# Patient Record
Sex: MALE | Race: WHITE | NOT HISPANIC OR LATINO | Employment: OTHER | URBAN - METROPOLITAN AREA
[De-identification: names, ages, dates, MRNs, and addresses within clinical notes are randomized per-mention and may not be internally consistent; named-entity substitution may affect disease eponyms.]

---

## 2024-02-01 ENCOUNTER — NURSING HOME VISIT (OUTPATIENT)
Dept: GERIATRICS | Facility: CLINIC | Age: 79
End: 2024-02-01
Payer: MEDICARE

## 2024-02-01 DIAGNOSIS — J96.01 ACUTE RESPIRATORY FAILURE WITH HYPOXIA (H): ICD-10-CM

## 2024-02-01 DIAGNOSIS — U07.1 INFECTION DUE TO 2019 NOVEL CORONAVIRUS: Primary | ICD-10-CM

## 2024-02-01 DIAGNOSIS — J43.2 CENTRILOBULAR EMPHYSEMA (H): ICD-10-CM

## 2024-02-01 DIAGNOSIS — D84.9 IMMUNOSUPPRESSED STATUS (H): ICD-10-CM

## 2024-02-01 DIAGNOSIS — Z79.4 TYPE 2 DIABETES MELLITUS WITHOUT COMPLICATION, WITH LONG-TERM CURRENT USE OF INSULIN (H): ICD-10-CM

## 2024-02-01 DIAGNOSIS — M05.741 RHEUMATOID ARTHRITIS INVOLVING BOTH HANDS WITH POSITIVE RHEUMATOID FACTOR (H): ICD-10-CM

## 2024-02-01 DIAGNOSIS — M05.742 RHEUMATOID ARTHRITIS INVOLVING BOTH HANDS WITH POSITIVE RHEUMATOID FACTOR (H): ICD-10-CM

## 2024-02-01 DIAGNOSIS — E11.9 TYPE 2 DIABETES MELLITUS WITHOUT COMPLICATION, WITH LONG-TERM CURRENT USE OF INSULIN (H): ICD-10-CM

## 2024-02-01 DIAGNOSIS — D62 ACUTE BLOOD LOSS ANEMIA: ICD-10-CM

## 2024-02-01 PROCEDURE — 99305 1ST NF CARE MODERATE MDM 35: CPT | Performed by: FAMILY MEDICINE

## 2024-02-01 NOTE — PROGRESS NOTES
[facility]    PRIMARY CARE PROVIDER AND CLINIC:  No primary care provider on file., No primary physician on file.  Chief complaint: New admission     [facility] Medical Record Number:  [id number]  Place of Service where encounter took place:  [facility] ([facility]) [[id number]]    Addison Silverman  is a 78 year old  (1945), admitted to the above facility from [facility]..   HPI: Patient recently admitted from [facility] after a long hospital stay for pneumonia complicated by COVID-19 anemia from a bleeding pancreatic pseudocyst.  He is admitted for rehabilitation prior to returning home.  discharge summary as outlined below    HOSPITALIST DISCHARGE SUMMARY  [facility]    Admission Date: 1/18/2024  Discharge Date: 1/29/2024    Discharge Plan: Addison Silverman was discharged to transitional care unit / skilled nursing facility.    Principal Diagnosis    Acute hypoxic respiratory failure due to COVID-19  Acute blood loss anemia due to Pancreatic pseudocyst    Hospital Problem List  Principal Problem:  Acute respiratory failure with hypoxia (HC)  Active Problems:  Coronary atherosclerosis of native coronary artery  Rheumatoid arthritis involving both hands with positive rheumatoid factor (HC)  Type 2 diabetes mellitus without complication (HC)  Immunosuppressed status (HC)  Hypothyroidism  Pneumonia due to COVID-19 virus  Acute blood loss anemia  Pancreatic pseudocyst  Hyponatremia    Additional Diagnosis Information    Recommended Nutrition Diagnosis  Moderate Malnutrition in the context of acute illness or injury - based on AND/ASPEN Clinical Characteristics of Malnutrition May 2012        Hospital Course    Addison Silverman is a 78 y.o. male with medical history of COPD, diabetes mellitus, rheumatoid arthritis (on rituximab), coronary artery disease, hypothyroidism. Per chart review, patient was admitted to [facility] on 12/27/2023 to 12/29/2023 for  treatment of pneumonia and was discharged on levofloxacin. He had subsequent worsening of his respiratory symptoms and was readmitted with COVID-19 and MRSA-pneumonia from 1/8-1/18/2024 at [facility]. MRSA was isolated from a bronchoscopy on 1/10/2024. He was treated with Cefepime, Vancomycin(completed 7 days) and metronidazole.    He was also noted to have dropping hemoglobin and CT abdomen revealed very large pancreatic pseudocyst that appeared to be bleeding. He was transferred to the ICU at [facility] on 1/18/2024. He was evaluated by general surgery, gastroenterology, and interventional radiology. CT angiogram did not suggest ongoing bleeding and observation was recommended.    Infectious disease was consulted for his pneumonia, he was continued on vancomycin and cefepime and treated with Decadron and remdesivir. 1/19, felt stable for transfer to floor. Vanco and cefepime d/c'ed 1/21/2024.    His respiratory status is slowly improving and he has weaned off to 1liter of oxygen. He is improving but still gets in respiratory distress when he tries to move around. Physical therapy/OCCUPATIONAL THERAPY evaluated patient and recommends TCU placement. Patient awaiting placement to a TCU at this time. Patient has been accepted at a TCU on Monday 1/29/2024 in [place].    Patient fond to have a bleeding pancreatic pseudocyst. On arrival to [facility] GI and IR were consulted and by that tme he was hemodyanmically stable and his Hemoglobin stabilized. His Hemoglobin on admission was 8.9 and Hemoglobin on 1/24 was 12.1. Patient advised to hold off on aspirin and restart on 2/5 and then recheck Hgb on 2/11/2024. Patient needs to follow up with GI in 3-4 weeks to asses shis Pancreatic pseudocyst again.    For his diabetes and hyperglycemia due to steroids started on lantus from his tresiba but changed to NPH insulin along with premeal novolog and SSI. On discharge we have reduced the dose of his  Lantus to 10 units twice daily (home dose of Tresiba U100 40 units twice daily) and novolog 10 units( home dose 14 units three times daily before meals) three times daily before meals with medium intensity SSI. Based on sugars in the coming weeks may chinmay to revert to Tresiba on discharge from TCU.    Recommendations for Outpatient Provider  PCP: Daisha Marquez PA-C    Admission: 1/18/2024 @ [facility]     Specific recommendations to be addressed at the follow up visit: Check CBC and BMP on 2/2/2024 and then onCBC on 2/11/2024   Medication changes:  Stopped aspirin and restart from 2/5/2024  Held Methotrexate and will restart from 2/11/2024.   Follow up labs/imaging: CBC and BMP on 2/2/2024 and then CBC on 2/11/2024.   Other specialty follow-up not included in DC orders: GI in 4 weeks.        Follow-Up    Follow Up Appointment(s):  Patient to follow up with [facility] for Pancreatic pseudocyst. Please call [phone] to make an appointment  When to follow up: 3-4 weeks  Primary Care Provider follow up appointment(s)  Daisha Marquez PA-C    When to follow up: 1 to 5 days        Discharge Medications        Your Home Medicines      START taking these medicines    Instructions  fluconazole 200 mg tablet  For diagnoses: Candida esophagitis (HC)  Commonly known as: DIFLUCAN  Take 1 Tablet (200 mg) by mouth once daily for 2 days.    Lantus Solostar U-100 Insulin 100 unit/mL (3 mL) pen  For diagnoses: Type 2 diabetes mellitus without complication, unspecified whether long term insulin use (HC)  Generic drug: insulin glargine (U-100)  Inject 20 units subcutaneous two times daily. Product desired: LANTUS SOLOSTAR. (Patient was on Tresiba U100 40 units SUBCUTANEOUS twice daily. Needs to switch on discharge from the TCU dependeing on his sugars.)    polyethylene glycol 17 g per packet packet  For diagnoses: Constipation, unspecified constipation type  Commonly known as: MIRALAX;  GLYCOLAX  Mix 17 g in liquid then take by mouth once daily.    Sennosides 17.2 mg Tab  For diagnoses: Constipation, unspecified constipation type  Take 17.2 mg by mouth 2 times daily if needed for Constipation.    traZODone 50 mg tablet  For diagnoses: Insomnia, unspecified type  Commonly known as: DESYREL  Take 1 Tablet (50 mg) by mouth at bedtime.          CHANGE how you take these medicines    Instructions  aspirin 81 mg enteric coated tablet  For diagnoses: Atherosclerosis of native coronary artery of native heart without angina pectoris  What changed:  additional instructions  These instructions start on February 5, 2024. If you are unsure what to do until then, ask your doctor or other care provider.  Start taking on: February 5, 2024  Commonly known as: ECOTRIN  Take 1 Tablet (81 mg) by mouth once daily with a meal. Restart from 2/5/2024    * insulin aspart (U-100) 100 unit/mL (3 mL) pen  For diagnoses: Type 2 diabetes mellitus without complication, unspecified whether long term insulin use (HC)  What changed: You were already taking a medication with the same name, and this prescription was added. Make sure you understand how and when to take each.  Commonly known as: NOVOLOG FLEXPEN  Inject 0-12 units subcutaneous three times daily before meals. Give subcutaneous 3 times a day with meals per blood glucose (mg/dL). Don't give corrective dose for PRN, post-prandial or nocturnal glucose checks unless ordered.  Blood Glucose.....Dose  <70.............See Hypoglycemia Protocol  ........No insulin, give prandial insulin, if ordered  150-199......2 units  200-249......4 units  250-299......6 units  300-349......8 units  350-399......10 units  400 or greater....12 units & call MD    * insulin aspart (U-100) 100 unit/mL (3 mL) pen  For diagnoses: Type 2 diabetes mellitus without complication, with long-term current use of insulin (HC)  What changed: additional instructions  Commonly known as: NovoLOG Flexpen  U-100 Insulin  INJECT 10 UNITS three times daily before meals .    methotrexate 2.5 mg tablet  For diagnoses: Rheumatoid arthritis involving both hands with positive rheumatoid factor  What changed:  additional instructions  These instructions start on February 11, 2024. If you are unsure what to do until then, ask your doctor or other care provider.  Start taking on: February 11, 2024  Commonly known as: RHEUMATREX  Take 10 Tablets (25 mg) by mouth once weekly. Restart from 2/11/2024        * This list has 2 medication(s) that are the same as other medications prescribed for you. Read the directions carefully, and ask your doctor or other care provider to review them with you.            TAKE THE MEDICINE(S) AS PRESCRIBED BELOW. The provider has reviewed how you said you take these medicine(s) and wants you to take them as prescribed, not as how you reported taking them.    Instructions  simvastatin 20 mg tablet  For diagnoses: Type 2 diabetes mellitus without complication, with long-term current use of insulin (HC)  Commonly known as: ZOCOR  TAKE TWO (2) TABLETS (40 MG) BY MOUTH AT BEDTIME.          CONTINUE taking these medicines    Instructions  acetaminophen  mg Extended-Release tablet  Commonly known as: TYLENOL ARTHRITIS  Take 1,300 mg by mouth once daily. Max acetaminophen dose: 4000mg in 24 hrs.    albuterol HFA 90 mcg/actuation inhaler  For diagnoses: Centrilobular emphysema (HC), Wheezing, Shortness of breath  Commonly known as: PRO-AIR; VENTOLIN; PROVENTIL  Inhale 2 Puffs by mouth every 4 hours if needed for Shortness Of Breath or Wheezing.    blood-glucose meter  For diagnoses: Diabetes 1.5, managed as type 2 (HC), Type 2 diabetes mellitus without complication, with long-term current use of insulin (HC)  Dispense meter, test strips, lancets covered by pt ins. E11.65 NIDDM type II, uncontrolled - Test 4 times/day. Reason: High A1C    CALCIUM 600 + D ORAL  Take 2 Tabs by mouth once  "daily.    clotrimazole 1 % cream  For diagnoses: Tinea cruris  Commonly known as: LOTRIMIN  Apply topically to affected area(s) two times daily.    Contour Next Test Strips strip  For diagnoses: Type 2 diabetes mellitus without complication, with long-term current use of insulin (HC)  Generic drug: blood sugar diagnostic  Dispense item covered by pt ins. E11.9 NIDDM type II - Test 4 times/day. Reason: Insulin injections    DIABETIC SHOE  For diagnoses: Diabetic feet (HC)  As directed.    folic acid 1 mg tablet  Take 1 mg by mouth once daily.    inhalational spacing device  For diagnoses: Centrilobular emphysema (HC), Wheezing, Shortness of breath  For home use.    levothyroxine 88 mcg tablet  For diagnoses: Hypothyroidism, unspecified type  Commonly known as: SYNTHROID  Take 1 Tablet (88 mcg) by mouth before breakfast.    nystatin (bulk) 15 billion unit Powd  For diagnoses: Diabetic feet (HC)  As directed.    omeprazole 20 mg Delayed-Release capsule  For diagnoses: Gastroesophageal reflux disease, unspecified whether esophagitis present  Commonly known as: PRILOSEC  Take 1 Capsule (20 mg) by mouth once daily before a meal.  Doctor's comments: 04/19/2023 2:28:12 PM    ondansetron 8 mg tablet  For diagnoses: Nausea and vomiting, unspecified vomiting type  Commonly known as: ZOFRAN  Take 1 Tablet (8 mg) by mouth every 8 hours if needed for Nausea/Vomiting.    pen needle, diabetic 31 gauge x 5/16\"  For diagnoses: Type 2 diabetes mellitus without complication, with long-term current use of insulin (HC)  Commonly known as: BD Insulin Pen Needle UF  For administering insulin at home.    predniSONE 5 mg tablet  Commonly known as: DELTASONE  Take 2 Tablets (10 mg) by mouth once daily with a meal. 10mg daily    RITUXAN IV  Inject 1,000 mg intravenous EVERY 6 MONTHS. Takes Q 14 days X2 doses then repeat in 6 months.          STOP taking these medicines    insulin degludec (U-100) 100 unit/mL (3 mL) pen  Commonly known as: " Tresiba FlexTouch U-100            Where to get your medicines      Prescriptions for these medicines or supplies were NOT printed nor sent to your preferred pharmacy. Check with your doctor if you have questions.  Check with your doctor if you have questions.  aspirin 81 mg enteric coated tablet  fluconazole 200 mg tablet  insulin aspart (U-100) 100 unit/mL (3 mL) pen  insulin aspart (U-100) 100 unit/mL (3 mL) pen  Lantus Solostar U-100 Insulin 100 unit/mL (3 mL) pen  methotrexate 2.5 mg tablet  polyethylene glycol 17 g per packet packet  Sennosides 17.2 mg Tab  traZODone 50 mg tablet      Pertinent Findings / Procedures  First weight: 75.4 kg (166 lb 3.6 oz) (01/18/24 0100) Last weight: 75.4 kg (166 lb 3.6 oz) (01/18/24 0100)  Labs    Renal Heme GI                Cardiopulmonary ID Endo  COVID-19: ++ POSITIVE 1/18/2024 01/26/24  1226 01/26/24  1826 01/26/24  2218 01/27/24  0631 01/27/24  1149  GLUCOSEMETER 243* 309* 319* 153* 212*      Micro  None new    Imaging    01/18/24 1430 CT CHEST ABDOMEN WWO Final result Details  Impression: 1. 13.4 cm hemorrhagic pancreatic pseudocyst replacing the body and tail of the pancreas associated with peripancreatic hyperdense fat stranding also consistent with hemorrhage. No aneurysm or active extravasation is identified. Consider subspecialty Interventional Radiology consultation for consideration of conventional angiography. Otherwise, close clinical follow-up as to the patient`s hemodynamic status is recommended.  2. Incidental findings described above.      CXR 1/20/24:  FINDINGS:  Enlarged cardiac silhouette. Median sternotomy. Enteric tube off the inferior field of view. Basilar interstitial opacities. No pneumothorax no effusion is      Consultants    Encounter Notes  Consults  Alejo Galindo MD Internal Medicine  1/19/2024  Maliha Lawrence PA Physician Assistant  1/18/2024  Shyla Swanson MD Surgery - General  1/18/2024  Avery Florez MD  Infectious Diseases  1/18/2024  Tod Palumbo MD Gastroenterology  1/18/2024  Yvrose Barton MD Resident  1/18/2024  Aubrey Houston MD Pulmonary Medicine  1/18/2024      Diet / Activity / Follow-Up    After Discharge Orders and Instructions    Activity and weight bearing as tolerated  Activity and weight bearing status as tolerated. Nursing staff to re-evaluate and modify as appropriate.  Admission H&P Valid: Yes  Agency Standing Orders: Yes  All Orders Valid For 45 Days Unless Otherwise Indicated  Allergies:  No Known Allergies    Blood Glucose Monitoring  Three times daily AC (Before meals)  Call physician or nurse practitioner for blood glucose less than 75 or greater than 400  Diet  Consistent Carbohydrate    Nutritional Supplements Ensure high protein qpm and at bedtime  Discharge Condition: Improving  Discharge Potential: Length of Stay LESS Than 30 Days  Discharge Summary: Enclosed  Free of Communicable Disease: Yes  Future Lab orders at [facility]  CBC and BMP on 2/2/2024 then CBC on 2/11/2024 as we are starting aspirin on 2/5/2024  Give 2-Step Mantoux: Yes, Unless Current or Contraindicated  Level of Care: Skilled  Oxygen - Continuous wean as able  Per nasal cannula.  To keep oxygen saturation greater than or equal to 90%.  Current oxygen flow rates: O2 (LPM): 1 (01/25/24 1508)  Patient Aware of Diagnosis: Yes  Patient may leave SNF supervised with medications  Rehab Potential: Good  Treatment - Occupational Therapy Eval and Treat  Treatment - Physical Therapy Eval and Treat  Treatment Options: Full Resuscitation  Treatment Options: Full Resuscitation  Vital Signs per facility routine  Weight per facility routine  Weigh on admission to skilled nursing facility  When should you be concerned?  At the skilled nursing facility let your health care providers know if you notice any changes in your condition.  Who can the receiving facility call with order questions?  If any questions arise within the  first 24 hours after discharge contact our service at [phone] WILLIAN Mckay .  Why were you at the hospital?  You were in the hospital for Acute hypoxic respiratory failure due to COVID-19, Acute blood loss anemia due to pancreatic pseudocyst, physical deconditioning        Pending Studies    Lab results that may not be resulted at time of discharge: (From admission through now)    Start Ordered  01/18/24 1115 MSO-Miscellaneous Send Out ONE TIME, TODAY    Question Answer Comment  Enter the test requested [place]  Direct contact number for Order follow up [phone]  Enter specific site/source Blood  Release to patient Immediate    01/18/24 1107              Total time spent on discharge coordination: 35 minutes. Patient was seen and examined today.    WILLIAN Mckay  Hospitalist, [facility]  [phone]     CODE STATUS/ADVANCE DIRECTIVES DISCUSSION:  No Order  CPR/Full code   ALLERGIES: Not on File   PAST MEDICAL HISTORY: No past medical history on file.   PAST SURGICAL HISTORY:   has no past surgical history on file.  FAMILY HISTORY: family history is not on file.  SOCIAL HISTORY:     Patient's living condition: lives with spouse    Post Discharge Medication Reconciliation Status:   MED REC REQUIRED  Post Medication Reconciliation Status: discharge medications reconciled, continue medications without change       No current outpatient medications on file.     No current facility-administered medications for this visit.       ROS:  4 point ROS including Respiratory, CV, GI and , other than that noted in the HPI,  is negative    Vitals:  There were no vitals taken for this visit.  Exam:  GENERAL APPEARANCE:  Alert, in no distress  RESP:  no respiratory distress  CV:  rate-normal  PSYCH:  oriented X 3    Lab/Diagnostic data:  Recent labs in [employer] reviewed by me today.     ASSESSMENT/PLAN:    1. Infection due to 2019 novel coronavirus    2. Immunosuppressed status  (H24)    3. Rheumatoid arthritis involving both hands with positive rheumatoid factor (H)    4. Acute respiratory failure with hypoxia (H)    5. Centrilobular emphysema (H)    6. Type 2 diabetes mellitus without complication, with long-term current use of insulin (H)    7. Acute blood loss anemia      Care plan reviewed, medications reviewed.  He will continue current plan and anticipate eventual return to home if he needs rehab requirements.  Follow-up appointments scheduled as well as follow-up laboratory evaluation as noted in the discharge summary.    Orders:        Electronically signed by:  Mil Jaimes MD

## 2024-02-02 PROBLEM — D62 ACUTE BLOOD LOSS ANEMIA: Status: ACTIVE | Noted: 2024-01-18

## 2024-02-02 PROBLEM — D84.9 IMMUNOSUPPRESSED STATUS (H): Status: ACTIVE | Noted: 2024-02-02

## 2024-02-02 PROBLEM — M05.741 RHEUMATOID ARTHRITIS INVOLVING BOTH HANDS WITH POSITIVE RHEUMATOID FACTOR (H): Status: ACTIVE | Noted: 2024-02-02

## 2024-02-02 PROBLEM — K86.3 PANCREATIC PSEUDOCYST: Status: ACTIVE | Noted: 2024-01-18

## 2024-02-02 PROBLEM — M05.742 RHEUMATOID ARTHRITIS INVOLVING BOTH HANDS WITH POSITIVE RHEUMATOID FACTOR (H): Status: ACTIVE | Noted: 2024-02-02

## 2024-02-02 PROBLEM — J43.2 CENTRILOBULAR EMPHYSEMA (H): Status: ACTIVE | Noted: 2024-02-02

## 2024-02-02 PROBLEM — J96.01 ACUTE RESPIRATORY FAILURE WITH HYPOXIA (H): Status: ACTIVE | Noted: 2024-02-02

## 2025-08-12 ENCOUNTER — PATIENT OUTREACH (OUTPATIENT)
Dept: FAMILY MEDICINE | Facility: CLINIC | Age: 80
End: 2025-08-12
Payer: MEDICARE